# Patient Record
Sex: FEMALE
[De-identification: names, ages, dates, MRNs, and addresses within clinical notes are randomized per-mention and may not be internally consistent; named-entity substitution may affect disease eponyms.]

---

## 2020-08-27 ENCOUNTER — HOSPITAL ENCOUNTER (EMERGENCY)
Dept: HOSPITAL 92 - ERS | Age: 40
Discharge: HOME | End: 2020-08-27
Payer: SELF-PAY

## 2020-08-27 DIAGNOSIS — R10.84: Primary | ICD-10-CM

## 2020-08-27 DIAGNOSIS — R00.0: ICD-10-CM

## 2020-08-27 LAB
ALBUMIN SERPL BCG-MCNC: 3.9 G/DL (ref 3.5–5)
ALP SERPL-CCNC: 143 U/L (ref 40–110)
ALT SERPL W P-5'-P-CCNC: 112 U/L (ref 8–55)
AMYLASE SERPL-CCNC: 35 U/L (ref 25–125)
ANION GAP SERPL CALC-SCNC: 13 MMOL/L (ref 10–20)
AST SERPL-CCNC: 60 U/L (ref 5–34)
BASOPHILS # BLD AUTO: 0 THOU/UL (ref 0–0.2)
BASOPHILS NFR BLD AUTO: 0.5 % (ref 0–1)
BILIRUB SERPL-MCNC: 0.5 MG/DL (ref 0.2–1.2)
BUN SERPL-MCNC: 13 MG/DL (ref 7–18.7)
CALCIUM SERPL-MCNC: 8.4 MG/DL (ref 7.8–10.44)
CHLORIDE SERPL-SCNC: 105 MMOL/L (ref 98–107)
CO2 SERPL-SCNC: 24 MMOL/L (ref 22–29)
CREAT CL PREDICTED SERPL C-G-VRATE: 0 ML/MIN (ref 70–130)
EOSINOPHIL # BLD AUTO: 0 THOU/UL (ref 0–0.7)
EOSINOPHIL NFR BLD AUTO: 1 % (ref 0–10)
GLOBULIN SER CALC-MCNC: 2.9 G/DL (ref 2.4–3.5)
GLUCOSE SERPL-MCNC: 96 MG/DL (ref 70–105)
HGB BLD-MCNC: 11.8 G/DL (ref 12–16)
LEUKOCYTE ESTERASE UR QL STRIP.AUTO: 500 LEU/UL
LIPASE SERPL-CCNC: 51 U/L (ref 8–78)
LYMPHOCYTES # BLD: 0.6 THOU/UL (ref 1.2–3.4)
LYMPHOCYTES NFR BLD AUTO: 13.5 % (ref 21–51)
MCH RBC QN AUTO: 28 PG (ref 27–31)
MCV RBC AUTO: 83 FL (ref 78–98)
MONOCYTES # BLD AUTO: 0.7 THOU/UL (ref 0.11–0.59)
MONOCYTES NFR BLD AUTO: 14.9 % (ref 0–10)
NEUTROPHILS # BLD AUTO: 3.2 THOU/UL (ref 1.4–6.5)
NEUTROPHILS NFR BLD AUTO: 70.3 % (ref 42–75)
PLATELET # BLD AUTO: 281 THOU/UL (ref 130–400)
POTASSIUM SERPL-SCNC: 3.7 MMOL/L (ref 3.5–5.1)
RBC # BLD AUTO: 4.23 MILL/UL (ref 4.2–5.4)
RBC UR QL AUTO: (no result) HPF (ref 0–3)
SODIUM SERPL-SCNC: 138 MMOL/L (ref 136–145)
SP GR UR STRIP: 1.01 (ref 1–1.04)
WBC # BLD AUTO: 4.5 THOU/UL (ref 4.8–10.8)
WBC UR QL AUTO: (no result) HPF (ref 0–3)

## 2020-08-27 PROCEDURE — 96374 THER/PROPH/DIAG INJ IV PUSH: CPT

## 2020-08-27 PROCEDURE — 83690 ASSAY OF LIPASE: CPT

## 2020-08-27 PROCEDURE — 93005 ELECTROCARDIOGRAM TRACING: CPT

## 2020-08-27 PROCEDURE — 81003 URINALYSIS AUTO W/O SCOPE: CPT

## 2020-08-27 PROCEDURE — 36415 COLL VENOUS BLD VENIPUNCTURE: CPT

## 2020-08-27 PROCEDURE — 80053 COMPREHEN METABOLIC PANEL: CPT

## 2020-08-27 PROCEDURE — 81015 MICROSCOPIC EXAM OF URINE: CPT

## 2020-08-27 PROCEDURE — 76705 ECHO EXAM OF ABDOMEN: CPT

## 2020-08-27 PROCEDURE — 85025 COMPLETE CBC W/AUTO DIFF WBC: CPT

## 2020-08-27 PROCEDURE — 84484 ASSAY OF TROPONIN QUANT: CPT

## 2020-08-27 PROCEDURE — 82150 ASSAY OF AMYLASE: CPT

## 2020-08-27 PROCEDURE — 96375 TX/PRO/DX INJ NEW DRUG ADDON: CPT

## 2020-08-27 NOTE — ULT
PRELIMINARY REPORT/[facility]/EMERGENCY AFTER HOURS PROCEDURE



EXAM: 

US Abdomen Limited, Right Upper Quadrant. 



CLINICAL HISTORY: 

Epigastric pain that radiates to back, N/V, hx cholecystectomy 



TECHNIQUE: 

Real-time ultrasound of the right upper quadrant with image documentation. 



COMPARISON: 

None provided. 



FINDINGS: 



LIVER: 

Measured 15 cm and demonstrates fatty infiltration. 



GALLBLADDER: 

Surgically absent 



COMMON BILE DUCT: 

No dilation.  Measures 3.4 mm 



PANCREAS: 

Partially obscured by overlying bowel gas. 



RIGHT KIDNEY: 

Unremarkable. No hydronephrosis.  Measures 9.4 cm 



IMPRESSION: 

Prior cholecystectomy with no acute process



 

ELECTRONICALLY SIGNED BY:

Livan Traore MD

Aug 27, 2020 2:40:21 AM CDT



This report is intended for review by the ordering physician only, in accordance of law. If you recei
ve this report in error, please call [facility] at [phone].



 





FINAL REPORT 





EXAM: US Gallbladder RUQ



CLINICAL HISTORY: Abdominal pain nausea and vomiting. Previous cholecystectomy..



COMPARISON: 10/17/2007                  



FINDINGS:

Pancreas:  Obscured by bowel gas.

Liver:Hepatic parenchyma has a heterogeneous echotexture. Subsequent limited evaluation for hepatic m
asses and intrahepatic biliary dilatation.  

Right hepatic lobe: 15.1 cm

Gallbladder: Surgically absent

Pérez's sign:Not applicable

Portal Vein: Patent.  Appropriate directional flow

Bile ducts: 0.4 cm common bile duct diameter

Right kidney: No hydronephrosis. Right kidney measures 9.4 cm in length.



IMPRESSION:



1. This report is in agreement with initial report by [facility]

2. Findings compatible with previous cholecystectomy.

3. Heterogeneous hepatic parenchymal echotexture may be due to hepatic steatosis or hepatocellular di
sease.



Transcribed Date/Time: 8/27/2020 7:38 AM



Reported By: Kindra Lewis 

Electronically Signed:  8/27/2020 10:44 AM

## 2020-08-29 NOTE — EKG
Test Reason : EMERGENCY

Blood Pressure : ***/*** mmHG

Vent. Rate : 107 BPM     Atrial Rate : 107 BPM

   P-R Int : 146 ms          QRS Dur : 070 ms

    QT Int : 324 ms       P-R-T Axes : 030 016 042 degrees

   QTc Int : 432 ms

 

Sinus tachycardia

Otherwise normal ECG

 

Confirmed by TERRY HAGER ([id number]),  COLE WORTHY ([id number]) on 8/29/2020 11:55:27 AM

 

Referred By:             Confirmed By:TERRY HAGER